# Patient Record
Sex: FEMALE | Race: WHITE
[De-identification: names, ages, dates, MRNs, and addresses within clinical notes are randomized per-mention and may not be internally consistent; named-entity substitution may affect disease eponyms.]

---

## 2018-04-17 ENCOUNTER — HOSPITAL ENCOUNTER (INPATIENT)
Dept: HOSPITAL 17 - NEPD | Age: 23
LOS: 3 days | Discharge: HOME | DRG: 885 | End: 2018-04-20
Attending: PSYCHIATRY & NEUROLOGY | Admitting: PSYCHIATRY & NEUROLOGY
Payer: COMMERCIAL

## 2018-04-17 VITALS — WEIGHT: 125.66 LBS | BODY MASS INDEX: 20.2 KG/M2 | HEIGHT: 66 IN

## 2018-04-17 VITALS
HEART RATE: 88 BPM | DIASTOLIC BLOOD PRESSURE: 89 MMHG | OXYGEN SATURATION: 100 % | TEMPERATURE: 98.6 F | RESPIRATION RATE: 16 BRPM | SYSTOLIC BLOOD PRESSURE: 141 MMHG

## 2018-04-17 VITALS
DIASTOLIC BLOOD PRESSURE: 83 MMHG | TEMPERATURE: 98.7 F | RESPIRATION RATE: 16 BRPM | HEART RATE: 118 BPM | SYSTOLIC BLOOD PRESSURE: 134 MMHG | OXYGEN SATURATION: 96 %

## 2018-04-17 VITALS
SYSTOLIC BLOOD PRESSURE: 141 MMHG | OXYGEN SATURATION: 100 % | RESPIRATION RATE: 16 BRPM | DIASTOLIC BLOOD PRESSURE: 89 MMHG | TEMPERATURE: 98.6 F | HEART RATE: 88 BPM

## 2018-04-17 DIAGNOSIS — Z91.5: ICD-10-CM

## 2018-04-17 DIAGNOSIS — R45.851: ICD-10-CM

## 2018-04-17 DIAGNOSIS — Z91.14: ICD-10-CM

## 2018-04-17 DIAGNOSIS — F33.2: Primary | ICD-10-CM

## 2018-04-17 DIAGNOSIS — F12.10: ICD-10-CM

## 2018-04-17 DIAGNOSIS — Z81.8: ICD-10-CM

## 2018-04-17 DIAGNOSIS — F41.0: ICD-10-CM

## 2018-04-17 DIAGNOSIS — G47.00: ICD-10-CM

## 2018-04-17 PROCEDURE — 83036 HEMOGLOBIN GLYCOSYLATED A1C: CPT

## 2018-04-17 PROCEDURE — 99285 EMERGENCY DEPT VISIT HI MDM: CPT

## 2018-04-17 PROCEDURE — 80061 LIPID PANEL: CPT

## 2018-04-17 PROCEDURE — 80048 BASIC METABOLIC PNL TOTAL CA: CPT

## 2018-04-17 RX ADMIN — Medication SCH MG: at 22:56

## 2018-04-17 NOTE — PD
HPI


Chief Complaint:  Psychiatric Symptoms


Time Seen by Provider:  02:43


Travel History


International Travel<30 days:  No


Contact w/Intl Traveler<30days:  No


Traveled to known affect area:  No





History of Present Illness


HPI


22-year-old white female presents emergency department as a transfer from 

[facility] [place].  The patient states that she was brought to 

the hospital by her significant other and family because she was having 

increasing suicidal thoughts.  She states that she has been battling depression 

for some time.  The patient advised her significant other that she had 

contemplated hanging herself.  Patient cannot explain why she feels more 

depressed today.  There is not any one precipitating event.  She denies any 

toxic ingestion.  She took 2 of her melatonin 5 mg each for sleep.  She denies 

any recent illness.  She denies any alcohol or tobacco.  She does smoke 

marijuana.  The patient had laboratory tests for medical clearance.  Last 

menstrual period was approximately 2 weeks ago.  Currently on birth control.  

The patient was placed under Baker act by the ER physician.





[facility]


Past Medical History


*** Narrative Medical


Depression ,substance abuse


Tetanus Vaccination:  < 5 Years


Pregnant?:  Not Pregnant





Past Surgical History


Surgical History:  No Previous Surgery





Social History


Alcohol Use:  No


Tobacco Use:  No


Substance Use:  Yes (Marijuana)





Review of Systems


General / Constitutional:  No: Fever


Eyes:  No: Visual changes


HENT:  No: Headaches


Cardiovascular:  No: Chest Pain or Discomfort


Respiratory:  No: Shortness of Breath


Gastrointestinal:  No: Abdominal Pain


Genitourinary:  No: Dysuria


Musculoskeletal:  No: Pain


Skin:  No Rash


Neurologic:  No: Weakness


Psychiatric:  Positive: Depression, Suicidal Ideations, Mood Disorder, 

Substance Abuse, No: Anxiety, Disorder of Thought, Homicidal Ideation


Endocrine:  No: Polydipsia


Hematologic/Lymphatic:  No: Easy Bruising





Physical Exam


Narrative


GENERAL: Well-nourished, well-developed patient.


SKIN: Warm and dry.


HEAD: Normocephalic and atraumatic.


EYES: No scleral icterus. No injection or drainage. 


ENT: No nasal drainage noted. Mucous membranes pink. Airway patent.


NECK: Supple, trachea midline.  Moves head freely without obvious discomfort.


CARDIOVASCULAR: Regular rate and rhythm without murmurs, gallops, or rubs. 


RESPIRATORY: Breath sounds equal bilaterally. No accessory muscle use.


GASTROINTESTINAL: Abdomen soft, non-tender, nondistended. 


EXTREMITIES: No cyanosis or edema.


BACK: Nontender without obvious deformity. No CVA tenderness.


NEURO: Patient is alert and oriented. no sensorimotor deficits.  Nonfocal.  

Normal speech.


PSYCH: No delusions.  No auditory or visual hallucinations.





Data


Data


Last Documented VS





Vital Signs








  Date Time  Temp Pulse Resp B/P (MAP) Pulse Ox O2 Delivery O2 Flow Rate FiO2


 


4/17/18 02:45 98.6 88 16 141/89 (106) 100 Room Air  











MDM


Medical Decision Making


Medical Screen Exam Complete:  Yes


Emergency Medical Condition:  Yes


Medical Record Reviewed:  Yes


Interpretation(s)


I reviewed the patient's laboratory tests including CBC, chemistry, hCG, drug 

screen.  Testing was was negative except positive for marijuana.


Differential Diagnosis


MDM: High


Differential diagnoses: Schizophrenia, schizoaffective disorder, bipolar, 

anxiety, depression, adjustment reaction, mood disorder NOS, ODD, depressive 

disorder NOS, dementia, dementia with agitation, psychosis NOS, substance 

induced mood disorder, DMDD, Asperger syndrome, infection,electrolyte 

abnormality, malingering.


Narrative Course


Mental health screening discussed with the patient.  Psychiatric screen ordered.





The patient been medically cleared.





This is medical clearance for psychiatric admission





Diagnosis





 Primary Impression:  


 Medical clearance for psychiatric admission


Condition:  Stable











Nicko Ramon Apr 17, 2018 02:59

## 2018-04-17 NOTE — PD
__________________________________________________





History of Present Illness


Chief Complaint:  Psychiatric Symptoms


Time Seen by Provider:  14:20


Travel History


International Travel<30 Days:  No


Contact w/Intl Traveler<30days:  No


Known affected area:  No





Legal Status


Legal Status:  Involuntary


Baker Act Comment:  Dr. You Carrillo, 


History of Present Illness:


History of Present Illness


HPI


22-year-old single, white female with history of major depression, no previous 

suicide attempt, history of self-injurious behavior mostly superficial cutting, 

presents emergency department as a transfer from [facility] [place] under a certificate of professional initiating involuntary 

examination initiated at [facility] ED.  The patient was brought to the 

hospital by her significant other after she reported that she hung a rope and 

was prepared to hang herself.  She states that she stopped because she did not 

want to hurt her family.  No recent stressors are reported.  She has not taken 

psychiatric medication for at least a year and a half.





Patient is seen and J pod. Alert, oriented, dressed in [facility] and 

maintaining basic hygiene.  Speech is clear, logical.  Fair amount of eye 

contact.  Tearful.  Mood is described as depressed and anxious with 

intermittent panic attack symptoms..  Reports difficulty sleeping without use 

of melatonin, decreased appetite, decreased level of energy, suicidal ideation.

   There is no psychosis, no delusions, no paranoia.  She is a does not endorse 

any symptoms of denice or hypomania.  The patient is expressing her desire to be 

discharged and states "now my family knows how I feel so they will be supportive

".  The patient last took psychiatric medications approximately a year to a 

year and a half ago.  She began treatment at age 16 and took Prozac for over 1 

year.  She stopped treatment because she felt better.  She restarted treatment 

while a student at [employer] She reports that in the past she has been treated with 

Lexapro, Prozac, Zoloft, Wellbutrin, Cymbalta, Pristiq.





I spoke with her mother who arrived to the ED this afternoon.  The mother 

states that the patient has an appointment with Dr. Thomansky  her previous 

psychiatrist on April 25.  Mother is informed that due to her suicidal ideation 

and currently not on medication that it is our recommendation that she be 

admitted to inpatient psychiatry for safety, medication and stabilization.  The 

patient and her mother are concerned because they live in [place].  I 

have advised them that the hospital in [place] stated there was no availability 

in the area.





[facility]


Past Medical History


Hx Anticoagulant Therapy:  No


Depression:  Yes


Cardiovascular Problems:  No


Chemotherapy:  No


Cerebrovascular Accident:  No


Diabetes:  No


Respiratory:  No


Tetanus Vaccination:  < 5 Years


Pregnant?:  Not Pregnant


LMP:  4/1/18





Past Surgical History


Surgical History:  No Previous Surgery


Hysterectomy:  No





Psychiatric History


Psychiatric History


Hx Psychiatric Treatment:  


HAS BEEN IN TREATMENT IN THE PAST (AGE 16) WITH DR CALDERON IN [facility]. 


Also received treatment while a student at [employer].  Has not received


psychiatric treatment in over 1 year.  Past history of self-injurious


behavior mainly superficial cutting of her wrists.


History of Inpatient Treatment:  No


Guns or firearms in home:  No





Social History


Single female .  Graduate of [employer].  Has been working in [employer] 

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  Lives with her boyfriend.


Hx Alcohol Use:  No


Hx Tobacco Use:  No


Hx Substance Use:  Yes (Marijuana)


Substance Use Type:  Marijuana


Hx of Substance Use Treatment:  No





Family Psychiatric History


Mother with report a history of depression





Allergies-Medications


(Allergen,Severity, Reaction):  


Coded Allergies:  


     sertraline (Verified  Allergy, Unknown, 4/17/18)


Reported Meds & Prescriptions





Reported Meds & Active Scripts


Active


Reported


Melatonin 5 Mg Tab 5 Mg PO HS


[Bcp]   1 PO





Review of Systems


Psychiatric:  COMPLAINS OF: Anxiety, Depression, Suicidal Ideation


Except as stated in HPI:  all other systems reviewed are Neg





Mental Status Examination


Appearance:  Appropriate (Maintaining basic hygiene)


Consciousness:  Alert


Orientation:  x4


Motor Activity:  Normal gait


Speech:  Unremarkable


Language:  Adequate


Fund of Knowledge:  Adequate


Attention and Concentration:  Adequate


Memory:  Unremarkable


Mood:  Sad, Other (Depressed)


Affect:  Other (Tearful)


Thought Process & Associations:  Intact, Logical, Goal directed


Thought Content:  Appropriate


Hallucination Type:  None


Delusion Type:  None


Suicidal Ideation:  No


Suicidal Plan:  Yes


Suicidal Intention:  No


Homicidal Ideation:  No


Homicidal Plan:  No


Homicidal Intention:  No


Insight:  Fair


Judgment:  Adequate





MDM


Medical Decision Making


Medical Record Reviewed:  Yes


Assessment/Plan


22-year-old single, white female with history of major depression, no previous 

suicide attempt, history of self-injurious behavior mostly superficial cutting, 

presents emergency department as a transfer from [facility] [place] under a certificate of professional initiating involuntary 

examination initiated at [facility] ED.  The patient was brought to the 

hospital by her significant other after she reported that she hung a rope and 

was prepared to hang herself.  She states that she stopped because she did not 

want to hurt her family.  Once patient is in J pod she is requesting to be 

discharged as she feels that her family are now aware of how depressed she had 

been feeling.  I am reluctant to release the Nassar act due to the patient with 

long history of depression, currently not medicated, with recent suicidal 

ideation with plan to hang herself.  Patient will be admitted to inpatient 

psychiatry for safety, initiation of medication, and for  and for stabilization 

of current symptoms.





Orders





 Orders


Psych Screen (4/17/18 02:58)


Lorazepam (Ativan) (4/17/18 05:15)


Diet Regular Basic (4/17/18 Breakfast)





Results





Vital Signs








  Date Time  Temp Pulse Resp B/P (MAP) Pulse Ox O2 Delivery O2 Flow Rate FiO2


 


4/17/18 02:51 98.6 88 16 141/89 (106) 100   


 


4/17/18 02:45 98.6 88 16 141/89 (106) 100 Room Air  











Diagnosis





 Primary Impression:  


 Medical clearance for psychiatric admission


 Additional Impression:  


 Depression





Admitting Information


Admitting Physician Requests:  Admit


Condition:  Stable





Problem Qualifiers








 Additional Impression:  


 Depression


 Qualified Codes:  F33.1 - Major depressive disorder, recurrent, moderate








Vesta Torres Apr 17, 2018 15:14

## 2018-04-18 VITALS
HEART RATE: 100 BPM | DIASTOLIC BLOOD PRESSURE: 77 MMHG | TEMPERATURE: 97.8 F | RESPIRATION RATE: 18 BRPM | SYSTOLIC BLOOD PRESSURE: 128 MMHG | OXYGEN SATURATION: 99 %

## 2018-04-18 VITALS
TEMPERATURE: 98.3 F | SYSTOLIC BLOOD PRESSURE: 118 MMHG | HEART RATE: 86 BPM | RESPIRATION RATE: 21 BRPM | OXYGEN SATURATION: 99 % | DIASTOLIC BLOOD PRESSURE: 66 MMHG

## 2018-04-18 LAB
BUN SERPL-MCNC: 10 MG/DL (ref 7–18)
CALCIUM SERPL-MCNC: 9.6 MG/DL (ref 8.5–10.1)
CHLORIDE SERPL-SCNC: 105 MEQ/L (ref 98–107)
CHOLEST SERPL-MCNC: 129 MG/DL (ref 120–200)
CHOLESTEROL/ HDL RATIO: 2.27 RATIO
CREAT SERPL-MCNC: 0.83 MG/DL (ref 0.5–1)
GFR SERPLBLD BASED ON 1.73 SQ M-ARVRAT: 86 ML/MIN (ref 89–?)
GLUCOSE SERPL-MCNC: 121 MG/DL (ref 74–106)
HBA1C MFR BLD: 4.9 % (ref 4.3–6)
HCO3 BLD-SCNC: 22.2 MEQ/L (ref 21–32)
HDLC SERPL-MCNC: 56.8 MG/DL (ref 40–60)
LDLC SERPL-MCNC: 52 MG/DL (ref 0–99)
SODIUM SERPL-SCNC: 138 MEQ/L (ref 136–145)
TRIGL SERPL-MCNC: 102 MG/DL (ref 42–150)

## 2018-04-18 RX ADMIN — Medication SCH MG: at 20:43

## 2018-04-18 RX ADMIN — MIRTAZAPINE SCH MG: 15 TABLET, FILM COATED ORAL at 20:43

## 2018-04-18 NOTE — HHI.HP
Provisional Diagnosis


Admission Date


Apr 17, 2018 at 15:21


Axis I.


Major depressive disorder recurrent severe without psychosis, marijuana abuse





                               Certification of Person's Competence 


                           To Provide Express and Informed Consent





I have personally examined Kathie Montoya , a person being served at [facility] on, Apr 18, 2018 12:52.


Express and informed consent means consent voluntarily given in writing, by a 

competent person, after sufficient explanation and disclosure of the subject 

matter involved to enable the person to make a knowing and willful decision 

without any element of force, fraud, deceit, duress, or other form of 

constraint or coercion.





This person is 18 years of age or older, is not now known to be incompetent to 

consent to treatment with a guardian advocate, and does not have a health care 

surrogate or proxy currently making medical treatment decisions.  I have found 

this person to be one of the following:





[] Competent to provide express and informed consent, as defined above, for 

voluntary admission to this facility and is competent to provide express and 

informed consent for treatment.  He/she has the consistent capacity to make 

well reasoned, willful, and knowing decisions concerning his or her medical or 

mental health treatment.  The person fully and consistently understands the 

purpose of the admission for examination/placement and is fully capable of 

personally exercising all rights assured [place]





[] Incompetent to provide express and informed consent to voluntary admission, 

and this is incompetent to provide express and informed consent to treatment.  

The person must be transferred to involuntary status and a petition for a 

guardian advocate filed with the Circuit Court.





[xxx] Refusing to provide express and informed consent to voluntary admission 

but is competent to provide express and informed consent for treatment.  The 

person must be discharged or transferred to involuntary status.





Form shall be completed within 24 hours of a person's arrival at the receiving 

facility and filed in the clinical record of each person:


1. Admitted on a voluntary basis


2. Permitted to provide express and informed consent to his/her own treatment


3. Allowed to transfer from involuntary to voluntary status


4. Prior to permitting a person to consent to his or her own treatment after 

having been previously found incompetent to consent to treatment.





History of Present Illness


Capacity:  Lacks Capacity (Patient lacks capacity to sign for admission, 

patient has capacity to sign for medication)


Psych Chief Complaint:  Depression with suicide intent to hang self


HPI


Patient is a 22-year-old white female comes for under a Nassar act signed by 

You Carrillo D0 dated  and 2130 hrs. daily this document reviewed and agreed 

with stating suicidal ideation and plan to hang self.  Patient was brought to 

[facility] medically screened and cleared at that facility 

urine toxicology positive for marijuana and alcohol level negative.  At the 

present time patient sitting quietly in exam room nurse Ernestine present 

throughout session.  Patient is tearful and sad and somewhat emotional.  She 

states a long history of being more depressed than happy.  She notes increased 

sadness over the past few months though she cannot identify any specific 

precipitating event.  She states she lives with her boyfriend of 5 years.  They 

have been living together for about 6 or 7 months, she states it is a good 

loving relationship.  However she also states over the past few months she has 

been smoking marijuana daily, before that it was occasional though frequent 

use.  She denies alcohol or other drug use.  She states she has been depressed 

mood, there is initial and mid insomnia, a.m. energy, there is marked decreased 

energy, there is decreased appetite, with anhedonia, and a decreased sex drive.

  She states decreased concentration and attention, increased social isolation.

  She finds herself avoiding crowds.  She denies voices or visions with this.  

She states the only self-medicating has been the marijuana.  She states the 

suicidal ideation has been gradually coming up over the past few weeks but is 

only in the past week or so she has been developing various plans leading up to 

her buying equipment to hang herself but stopping herself before she put the 

noose around her neck, calling her boyfriend who called her family leading to 

the Nassar act.  Patient gives a history of past psychiatric contact as an 

outpatient be been tried on multiple antidepressants in the past.  She states 

she had some good luck with Zoloft until she developed a rash.  She does deny 

mood swings.  She states she said her episodes when she felt she had a more 

normal level of energy and focus.  She denies any other suicidal attempts.  She 

denies any prior physical and/or sexual abuse.  She states there is mental 

health history in her family of origin.  It appears mother has called patient's 

prior psychiatrist today.  She can see her on 4/25.  At the present time 

patient meets criteria for involuntary psychiatric hospitalization L the first 

opinion request second opinion.  We did discuss medications.  We will start the 

patient on Remeron 15 mg at at bedtime and Seroquel 25 mg at 8 AM and 4 PM.  We 

will offer Benadryl at at bedtime also.  We will also allow Atarax.  We will 

refrain from benzodiazepines and opiates.  Hope this to be a fairly short stay 

and she can be returned to her boyfriend and her family patient denies any 

significant medical or surgical history.  Denies head trauma or loss of 

consciousness, states she has been on birth control pills for a number of years.





Review of Systems


Constitutional:  DENIES: Diaphoretic episodes, Fatigue, Fever, Weight gain, 

Weight loss, Chills, Dizziness, Change in appetite, Night Sweats


Endocrine:  DENIES: Abnorml menstrual pattern, Heat/cold intolerance, Polydipsia

, Polyuria, Polyphagia


Eyes:  DENIES: Blurred vision, Diplopia, Eye inflammation, Eye pain, Vision loss

, Photosensitivity, Double Vision


Ears, nose, mouth, throat:  DENIES: Tinnitus, Hearing loss, Vertigo, Nasal 

discharge, Oral lesions, Throat pain, Hoarseness, Ear Pain, Running Nose, 

Epistaxis, Sinus Pain, Toothache, Odynophagia


Respiratory:  DENIES: Apneas, Cough, Snoring, Wheezing, Hemoptysis, Sputum 

production, Shortness of breath


Cardiovascular:  DENIES: Chest pain, Palpitations, Syncope, Dyspnea on Exertion

, PND, Lower Extremity Edema, Orthopnea, Claudication


Gastrointestinal:  DENIES: Abdominal pain, Black stools, Bloody stools, 

Constipation, Diarrhea, Nausea, Vomiting, Difficulty Swallowing, Anorexia


Genitourinary:  DENIES: Abnormal vaginal bleeding, Dysmenorrhea, Dyspareunia, 

Sexual dysfunction, Urinary frequency, Urinary incontinence, Urgency, Hematuria

, Dysuria, Nocturia, Vaginal discharge


Musculoskeletal:  DENIES: Joint pain, Muscle aches, Stiffness, Joint Swelling, 

Back pain, Neck pain


Integumentary:  DENIES: Abnormal pigmentation, Pruritus, Rash, Nail changes, 

Breast masses, Breast skin changes, Nipple discharge


Hematologic/lymphatic:  DENIES: Bruising, Lymphadenopathy


Immunologic/allergic:  DENIES: Eczema, Urticaria


Neurologic:  DENIES: Abnormal gait, Headache, Localized weakness, Paresthesias, 

Seizures, Speech Problems, Tremor, Poor Balance


Psychiatric:  COMPLAINS OF: Anxiety, Depression, Suicidal Ideation





Past Psych History


Psychological trauma history


Patient denies any physical or sexual abuse


Violence risk - others (6 mos)


Low


Violence risk - self (6 mos)


Patient had plan to hang self





Substance Abuse History


Drugs/Alcohol past 12 months


Patient verbal able access healthcare has supportive family and boyfriend





Past Family Social History


Coded Allergies:  


     sertraline (Verified  Allergy, Unknown, 4/17/18)


Reported Medications


Melatonin (Melatonin) 5 Mg Tab, 5 MG PO HS for Provide Good Sleep, TAB 0 Refills


   4/17/18


[Bcp]   No Conflict Check, 1 PO


   4/17/18





Current Medications








 Medications


  (Trade)  Dose


 Ordered  Sig/Garret


 Route  Start Time


 Stop Time Status Last Admin


 


  (Tylenol)  650 mg  Q4H  PRN


 PO  4/17/18 15:30


     


 


 


  (Milk Of


 Magnesia Liq)  30 ml  DAILY  PRN


 PO  4/17/18 15:30


     


 


 


  (Mag-Al Plus


 Susp Liq)  30 ml  Q6H  PRN


 PO  4/17/18 15:30


     


 


 


  (Melatonin)  5 mg  HS


 PO  4/17/18 21:00


    4/17/18 22:56


 


 


  (Remeron)  15 mg  HS


 PO  4/18/18 21:00


   UNV  


 


 


  (SEROquel)  25 mg  BID@0800,1600


 PO  4/18/18 16:00


   UNV  


 


 


  (Benadryl)  50 mg  HS  PRN


 PO  4/18/18 12:45


   UNV  


 


 


  (Atarax)  50 mg  Q6H  PRN


 PO  4/18/18 12:45


   UNV  


 








Family Psych History


Strong history mental health issues and family


Social History


Patient living with boyfriend of 5 years have been cohabitating for about 6 

months, she also has a pet hamster


Patient's Strengths (min. 2)


Patient verbal able access healthcare





Physical Exam


Patient medically cleared [facility].  At the present time 

patient sitting quietly in exam room she is in no acute distress.  She is in no 

respiratory distress.  No complaints of abdominal pain.  Patient moving all 4 

extremities without difficulty.  No abnormal motor movements noted


Vital Signs





Vital Signs








  Date Time  Temp Pulse Resp B/P (MAP) Pulse Ox O2 Delivery O2 Flow Rate FiO2


 


4/18/18 05:33 98.3 86 21 118/66 (83) 99   


 


4/17/18 02:45      Room Air  














I/O   


 


 4/18/18 4/18/18 4/19/18





 08:00 16:00 00:00


 


Intake Total  240 ml 


 


Balance  240 ml 








Lab Results











Test


  4/18/18


08:15


 


Blood Urea Nitrogen 10 MG/DL 


 


Creatinine 0.83 MG/DL 


 


Random Glucose 121 MG/DL 


 


Calcium Level 9.6 MG/DL 


 


Sodium Level 138 MEQ/L 


 


Potassium Level 3.5 MEQ/L 


 


Chloride Level 105 MEQ/L 


 


Carbon Dioxide Level 22.2 MEQ/L 


 


Anion Gap 11 MEQ/L 


 


Estimat Glomerular Filtration


Rate 86 ML/MIN 


 


 


Triglycerides Level 102 MG/DL 


 


Cholesterol Level 129 MG/DL 


 


LDL Cholesterol 52 MG/DL 


 


HDL Cholesterol 56.8 MG/DL 


 


Cholesterol/HDL Ratio 2.27 RATIO 











Mental Status Examination


Appearance:  Appropriate (Maintaining basic hygiene)


Consciousness:  Alert


Orientation:  x4


Motor Activity:  Normal gait


Speech:  Unremarkable


Language:  Adequate


Fund of Knowledge:  Adequate


Attention and Concentration:  Adequate


Memory:  Unremarkable


Mood:  Sad, Anxious, Other (Depressed)


Affect:  Other (Increased range and intensity)


Thought Process & Associations:  Intact, Logical, Goal directed


Thought Content:  Appropriate


Hallucination Type:  None


Delusion Type:  None


Suicidal Ideation:  Yes (Patient denies at present time was somewhat vague 

about taking suicide pill)


Suicidal Plan:  Yes (Patient denies at the present time)


Suicidal Intention:  No


Homicidal Ideation:  No


Homicidal Plan:  No


Homicidal Intention:  No


Insight:  Fair


Judgment:  Adequate





Assessment & Plan


Problem List:  


(1) Major depressive disorder, recurrent severe without psychotic features


ICD Codes:  F33.2 - Major depressive disorder, recurrent severe without 

psychotic features


(2) Marijuana abuse


ICD Codes:  F12.10 - Cannabis abuse, uncomplicated


Assessment & Plan


Estimated LOS: 3-5 days patient continues depressed with vague suicidal ideation

, patient does meet criteria for further hospitalization assessment under the 

Nassar act L the first opinion request second opinion.  I feel she is capacity 

signed for medication.  We will start on medications as mentioned above 

hopefully plan to return her to her family and boyfriend


Discharge Planning


Probable return home to family and boyfriend


Request HC Surrog/Guard Advoc?:  No











Norman Callahan MD Apr 18, 2018 13:08

## 2018-04-19 VITALS
OXYGEN SATURATION: 97 % | TEMPERATURE: 98.1 F | RESPIRATION RATE: 16 BRPM | SYSTOLIC BLOOD PRESSURE: 104 MMHG | DIASTOLIC BLOOD PRESSURE: 60 MMHG | HEART RATE: 92 BPM

## 2018-04-19 VITALS
OXYGEN SATURATION: 96 % | SYSTOLIC BLOOD PRESSURE: 133 MMHG | TEMPERATURE: 97.7 F | RESPIRATION RATE: 17 BRPM | HEART RATE: 81 BPM | DIASTOLIC BLOOD PRESSURE: 83 MMHG

## 2018-04-19 RX ADMIN — Medication SCH MG: at 22:11

## 2018-04-19 RX ADMIN — MIRTAZAPINE SCH MG: 15 TABLET, FILM COATED ORAL at 22:11

## 2018-04-19 NOTE — PD.PSY.CON
Provisional Diagnosis


Admission Date


Apr 17, 2018 at 15:21


Axis I.


Major depressive disorder recurrent severe without psychosis, marijuana abuse





History of Present Illness


Service


Psychiatry


Consult Requested By


Dr. Callahan


Reason for Consult


Suicidal ideation/second


Primary Care Physician


Unknown


HPI


Patient is a 22-year-old white female comes for under a Nassar act signed by 

You Carrillo D0 dated  and 2130 hrs. daily this document reviewed and agreed 

with stating suicidal ideation and plan to hang self.  Patient was brought to 

[facility] medically screened and cleared at that facility 

urine toxicology positive for marijuana and alcohol level negative.  At the 

present time patient sitting quietly in exam room nurse Ernestine present 

throughout session.  Patient is tearful and sad and somewhat emotional.  She 

states a long history of being more depressed than happy.  She notes increased 

sadness over the past few months though she cannot identify any specific 

precipitating event.  She states she lives with her boyfriend of 5 years.  They 

have been living together for about 6 or 7 months, she states it is a good 

loving relationship.  However she also states over the past few months she has 

been smoking marijuana daily, before that it was occasional though frequent 

use.  She denies alcohol or other drug use.  She states she has been depressed 

mood, there is initial and mid insomnia, a.m. energy, there is marked decreased 

energy, there is decreased appetite, with anhedonia, and a decreased sex drive.

  She states decreased concentration and attention, increased social isolation.

  She finds herself avoiding crowds.  She denies voices or visions with this.  

She states the only self-medicating has been the marijuana.  She states the 

suicidal ideation has been gradually coming up over the past few weeks but is 

only in the past week or so she has been developing various plans leading up to 

her buying equipment to hang herself but stopping herself before she put the 

noose around her neck, calling her boyfriend who called her family leading to 

the Nassar act.  Patient gives a history of past psychiatric contact as an 

outpatient be been tried on multiple antidepressants in the past.  She states 

she had some good luck with Zoloft until she developed a rash.  She does deny 

mood swings.  She states she said her episodes when she felt she had a more 

normal level of energy and focus.  She denies any other suicidal attempts.  She 

denies any prior physical and/or sexual abuse.  She states there is mental 

health history in her family of origin.  It appears mother has called patient's 

prior psychiatrist today.  She can see her on 4/25.  At the present time 

patient meets criteria for involuntary psychiatric hospitalization L the first 

opinion request second opinion.  We did discuss medications.  We will start the 

patient on Remeron 15 mg at at bedtime and Seroquel 25 mg at 8 AM and 4 PM.  We 

will offer Benadryl at at bedtime also.  We will also allow Atarax.  We will 

refrain from benzodiazepines and opiates.  Hope this to be a fairly short stay 

and she can be returned to her boyfriend and her family patient denies any 

significant medical or surgical history.  Denies head trauma or loss of 

consciousness, states she has been on birth control pills for a number of years.





The patient is a 22-year-old  woman, domiciled with boyfriend in 

[place], [facility], with psychiatric history of major depressive disorder, 

anxiety, no previous psychiatric hospitalizations, no previous suicide attempts

, no significant medical history,who comes for under a Nassar act signed by 

You Carrillo D0 dated  and 2130 hrs. daily this document reviewed and agreed 

with stating suicidal ideation and plan to hang self.  Patient was brought to 

[facility] medically screened and cleared at that facility 

urine toxicology positive for marijuana and alcohol level negative.  Patient 

was consulted to me for second opinion.  On psychiatric evaluation today 

patient is calm, cooperative, pleasant.  Patient reports that she has been very 

depressed, with poor motivation to do things, with increased emotional pain, no 

enjoying her life, and thinking often in hanging herself.  She reports that she 

gets into this periods every time or often.  At this moment she feels much 

better, feels like she is getting the help that she needs.  She denies suicidal 

and was ideation, she denies visual and auditory hallucinations.





Review of Systems


Constitutional:  DENIES: Diaphoretic episodes, Fatigue, Fever, Weight gain, 

Weight loss, Chills, Dizziness, Change in appetite, Night Sweats


Endocrine:  DENIES: Abnorml menstrual pattern, Heat/cold intolerance, Polydipsia

, Polyuria, Polyphagia


Eyes:  DENIES: Blurred vision, Diplopia, Eye inflammation, Eye pain, Vision loss

, Photosensitivity, Double Vision


Ears, nose, mouth, throat:  DENIES: Tinnitus, Hearing loss, Vertigo, Nasal 

discharge, Oral lesions, Throat pain, Hoarseness, Ear Pain, Running Nose, 

Epistaxis, Sinus Pain, Toothache, Odynophagia


Respiratory:  DENIES: Apneas, Cough, Snoring, Wheezing, Hemoptysis, Sputum 

production, Shortness of breath


Cardiovascular:  DENIES: Chest pain, Palpitations, Syncope, Dyspnea on Exertion

, PND, Lower Extremity Edema, Orthopnea, Claudication


Gastrointestinal:  DENIES: Abdominal pain, Black stools, Bloody stools, 

Constipation, Diarrhea, Nausea, Vomiting, Difficulty Swallowing, Anorexia


Genitourinary:  DENIES: Abnormal vaginal bleeding, Dysmenorrhea, Dyspareunia, 

Sexual dysfunction, Urinary frequency, Urinary incontinence, Urgency, Hematuria

, Dysuria, Nocturia, Vaginal discharge


Musculoskeletal:  DENIES: Joint pain, Muscle aches, Stiffness, Joint Swelling, 

Back pain, Neck pain


Integumentary:  DENIES: Abnormal pigmentation, Pruritus, Rash, Nail changes, 

Breast masses, Breast skin changes, Nipple discharge


Hematologic/lymphatic:  DENIES: Bruising, Lymphadenopathy


Immunologic/allergic:  DENIES: Eczema, Urticaria


Neurologic:  DENIES: Abnormal gait, Headache, Localized weakness, Paresthesias, 

Seizures, Speech Problems, Tremor, Poor Balance


Psychiatric:  DENIES: Anxiety, Confusion, Mood changes, Depression, 

Hallucinations, Agitation, Suicidal Ideation, Homicidal Ideation, Delusions





Past Family Social History


Coded Allergies:  


     sertraline (Verified  Allergy, Unknown, 4/17/18)


Reported Medications


Melatonin (Melatonin) 5 Mg Tab, 5 MG PO HS for Provide Good Sleep, TAB 0 Refills


   4/17/18


[Bcp]   No Conflict Check, 1 PO


   4/17/18





Current Medications








 Medications


  (Trade)  Dose


 Ordered  Sig/Garret


 Route  Start Time


 Stop Time Status Last Admin


 


  (Tylenol)  650 mg  Q4H  PRN


 PO  4/17/18 15:30


     


 


 


  (Milk Of


 Magnesia Liq)  30 ml  DAILY  PRN


 PO  4/17/18 15:30


     


 


 


  (Mag-Al Plus


 Susp Liq)  30 ml  Q6H  PRN


 PO  4/17/18 15:30


     


 


 


  (Melatonin)  5 mg  HS


 PO  4/17/18 21:00


    4/18/18 20:43


 


 


  (Remeron)  15 mg  HS


 PO  4/18/18 21:00


    4/18/18 20:43


 


 


  (Benadryl)  50 mg  HS  PRN


 PO  4/18/18 21:00


    4/18/18 20:43


 


 


  (Atarax)  50 mg  Q6H  PRN


 PO  4/18/18 12:45


     


 


 


  (SEROquel)  25 mg  BID@0800,1600


 PO  4/18/18 16:00


    4/19/18 08:31


 








Social History


Patient was born and raised in [place], she lives with her boyfriend in 

[place], she works in the [employer], she has a bachelor degree


Patient's Strengths (min. 2)


Patient verbal able access healthcare





Physical Exam


Vital Signs





Vital Signs








  Date Time  Temp Pulse Resp B/P (MAP) Pulse Ox O2 Delivery O2 Flow Rate FiO2


 


4/19/18 05:43 98.1 92 16 104/60 (75) 97   


 


4/17/18 02:45      Room Air  











Mental Status Examination


Appearance:  Appropriate (Maintaining basic hygiene)


Consciousness:  Alert


Orientation:  x4


Motor Activity:  Normal gait


Speech:  Unremarkable


Language:  Adequate


Fund of Knowledge:  Adequate


Attention and Concentration:  Adequate


Memory:  Unremarkable


Mood:  Sad, Anxious, Other (Depressed)


Affect:  Other (Increased range and intensity)


Thought Process & Associations:  Intact, Logical, Goal directed


Thought Content:  Appropriate


Hallucination Type:  None


Delusion Type:  None


Suicidal Ideation:  Yes (Patient denies at present time was somewhat vague 

about taking suicide pill)


Suicidal Plan:  Yes (Patient denies at the present time)


Suicidal Intention:  No


Homicidal Ideation:  No


Homicidal Plan:  No


Homicidal Intention:  No


Insight:  Fair


Judgment:  Adequate





Assessment & Plan


Problem List:  


(1) Major depressive disorder, recurrent severe without psychotic features


ICD Codes:  F33.2 - Major depressive disorder, recurrent severe without 

psychotic features


Assessment & Plan:  I have seen and examined this patient, review the 

documentation, I agree and concur with Dr. Callahan's assessment and plan.





(2) Marijuana abuse


ICD Codes:  F12.10 - Cannabis abuse, uncomplicated


Assessment & Plan


Estimated LOS:  days


Request HC Surrog/Guard Advoc?:  No











Tevin Kelley MD Apr 19, 2018 10:22

## 2018-04-20 VITALS
HEART RATE: 92 BPM | OXYGEN SATURATION: 99 % | SYSTOLIC BLOOD PRESSURE: 123 MMHG | DIASTOLIC BLOOD PRESSURE: 66 MMHG | RESPIRATION RATE: 16 BRPM | TEMPERATURE: 98.2 F

## 2018-04-20 NOTE — HHI.DS
Psychiatry Discharge Summary


Inpatient Psychiatric care?:  Yes


Advance Directive:  No


Reason Not Provided:  Due to Patient Condition


Mental Health AdvanceDirective:  No


Health Care Proxy:  No


Admission


Admission Date


Apr 17, 2018 at 15:21


Admission Diagnosis:  


(1) Major depressive disorder, recurrent severe without psychotic features


ICD Code:  F33.2 - Major depressive disorder, recurrent severe without 

psychotic features


Brief History


Patient is a 22-year-old white female comes for under a Nassar act signed by 

You Carrillo D0 dated  and 2130 hrs. daily this document reviewed and agreed 

with stating suicidal ideation and plan to hang self.  Patient was brought to 

[facility] medically screened and cleared at that facility 

urine toxicology positive for marijuana and alcohol level negative.  At the 

present time patient sitting quietly in exam room nurse Ernestine present 

throughout session.  Patient is tearful and sad and somewhat emotional.  She 

states a long history of being more depressed than happy.  She notes increased 

sadness over the past few months though she cannot identify any specific 

precipitating event.  She states she lives with her boyfriend of 5 years.  They 

have been living together for about 6 or 7 months, she states it is a good 

loving relationship.  However she also states over the past few months she has 

been smoking marijuana daily, before that it was occasional though frequent 

use.  She denies alcohol or other drug use.  She states she has been depressed 

mood, there is initial and mid insomnia, a.m. energy, there is marked decreased 

energy, there is decreased appetite, with anhedonia, and a decreased sex drive.

  She states decreased concentration and attention, increased social isolation.

  She finds herself avoiding crowds.  She denies voices or visions with this.  

She states the only self-medicating has been the marijuana.  She states the 

suicidal ideation has been gradually coming up over the past few weeks but is 

only in the past week or so she has been developing various plans leading up to 

her buying equipment to hang herself but stopping herself before she put the 

noose around her neck, calling her boyfriend who called her family leading to 

the Nassar act.  Patient gives a history of past psychiatric contact as an 

outpatient be been tried on multiple antidepressants in the past.  She states 

she had some good luck with Zoloft until she developed a rash.  She does deny 

mood swings.  She states she said her episodes when she felt she had a more 

normal level of energy and focus.  She denies any other suicidal attempts.  She 

denies any prior physical and/or sexual abuse.  She states there is mental 

health history in her family of origin.  It appears mother has called patient's 

prior psychiatrist today.  She can see her on 4/25.  At the present time 

patient meets criteria for involuntary psychiatric hospitalization L the first 

opinion request second opinion.  We did discuss medications.  We will start the 

patient on Remeron 15 mg at at bedtime and Seroquel 25 mg at 8 AM and 4 PM.  We 

will offer Benadryl at at bedtime also.  We will also allow Atarax.  We will 

refrain from benzodiazepines and opiates.  Hope this to be a fairly short stay 

and she can be returned to her boyfriend and her family patient denies any 

significant medical or surgical history.  Denies head trauma or loss of 

consciousness, states she has been on birth control pills for a number of years.





The patient is a 22-year-old  woman, domiciled with boyfriend in 

[place], [facility], with psychiatric history of major depressive disorder, 

anxiety, no previous psychiatric hospitalizations, no previous suicide attempts

, no significant medical history,who comes for under a Nassar act signed by 

You Carrillo D0 dated  and 2130 hrs. daily this document reviewed and agreed 

with stating suicidal ideation and plan to hang self.  Patient was brought to 

[facility] medically screened and cleared at that facility 

urine toxicology positive for marijuana and alcohol level negative.  Patient 

was consulted to me for second opinion.  On psychiatric evaluation today 

patient is calm, cooperative, pleasant.  Patient reports that she has been very 

depressed, with poor motivation to do things, with increased emotional pain, no 

enjoying her life, and thinking often in hanging herself.  She reports that she 

gets into this periods every time or often.  At this moment she feels much 

better, feels like she is getting the help that she needs.  She denies suicidal 

and was ideation, she denies visual and auditory hallucinations.


Tobacco Use In Past 30 Days:  No Tobacco Past 30 Days


Alcohol Use:  Never


Hospital Course


Patient was admitted to a locked psychiatric unit.  Safety precautions were in 

place throughout the visit.  Patient was seen daily by a psychiatric provider 

and followed by counselor.  Patient was placed on Seroquel to manage symptoms.  

At follow-up today patient was evaluated in the exam room.  After reviewing her 

chart and discussing case with staff.  Patient is awake, alert and oriented 4.

  Her speech is clear, logical, and organized.  Her mood is good her affect is 

euthymic.  There is no indication of internal stimulation.  I can elicit no 

delusional material.  There is no apparent thought blocking.  Patient denies 

suicidal and homicidal ideation, she denies AVH.  Patient reports that she 

"feels much better".  States that she has been sleeping and eating well.  She 

advises that she already has an appointment set up with a psychiatrist that she 

used to see for next week.  She states that she will be moving back in with her 

boyfriend however, her parents live close.  Her counselor, Gail, reports 

that the mother has been staying locally at a hotel throughout the visit and 

remains extremely involved with her daughter.  I believe at this time that 

patient has reached maximum benefit for this admission.  She no longer meets 

Baker act or inpatient admission criteria and does not pose a danger to herself 

or others.  She will be discharged with a prescription for her medications and 

instructions to return to this facility should her condition worsen at any time.





Results


Blood Pressure


123 / 66





Vital Signs








  Date Time  Temp Pulse Resp B/P (MAP) Pulse Ox O2 Delivery O2 Flow Rate FiO2


 


4/20/18 05:01 98.2 92 16 123/66 (85) 99   


 


4/17/18 02:45      Room Air  











Laboratory Tests








Test


  4/18/18


08:15


 


Random Glucose


  121 MG/DL


()


 


Estimat Glomerular Filtration


Rate 86 ML/MIN


(>89)








 Laboratory Results








Test


  4/18/18


08:15


 


Cholesterol Level


  129 MG/DL


(120-200)


 


HDL Cholesterol


  56.8 MG/DL


(40.0-60.0)


 


Hemoglobin A1c


  4.9 %


(4.3-6.0)


 


LDL Cholesterol


  52 MG/DL


(0-99)


 


Triglycerides Level


  102 MG/DL


()








Summary of Procedures


None


Pending results at discharge:  No





Medications


# of Antipsychotic meds at D/C:  1


Appropriate >1 Antipsych meds?:  1


Approp Antipsych med options


1 - Minimum of three failed multiple trials of monotherapy.


2 - Documented plan to taper to monotherapy due to previous use of multiple 

meds OR cross-taper in progress at D/C.


3 - Documentation of augmentation of Clozapine.


4 - Justification other than those listed in allowable values 1-3, document here

:





Discharge


Discharge Date:  Apr 20, 2018


Discharge Diagnosis:  


(1) Major depressive disorder, recurrent severe without psychotic features


Diagnosis:  Principal


ICD Code:  F33.2 - Major depressive disorder, recurrent severe without 

psychotic features


Pt Condition on Discharge:  Stable


Discharge Disposition:  Discharge Home





Discharge Instructions


Diet Instructions:  As Tolerated, No Restrictions


Activities you can perform:  Regular-No Restrictions





Discharge Time


> 30 minutes





Mental Status Examination


Appearance:  Appropriate (Maintaining basic hygiene)


Consciousness:  Alert


Orientation:  x4


Motor Activity:  Normal gait


Speech:  Unremarkable


Language:  Adequate


Fund of Knowledge:  Adequate


Attention and Concentration:  Adequate


Memory:  Unremarkable


Mood:  Sad, Anxious, Other (Depressed)


Affect:  Other (Increased range and intensity)


Thought Process & Associations:  Intact, Logical, Goal directed


Thought Content:  Appropriate


Hallucination Type:  None


Delusion Type:  None


Suicidal Ideation:  No


Suicidal Plan:  No


Suicidal Intention:  No


Homicidal Ideation:  No


Homicidal Plan:  No


Homicidal Intention:  No


Insight:  Fair


Judgment:  Adequate





Discharge/Advance Care Plan


Health Problems:  


(1) Major depressive disorder, recurrent severe without psychotic features


(2) Marijuana abuse


Goals to promote your health


* To prevent worsening of your condition and complications


* To maintain your health at the optimal level


Directions to meet your goals


*** Take your medications as prescribed


***  Follow your dietary instruction


***  Follow activity as directed





***  Keep your appointments as scheduled


***  Take your immunizations and boosters as scheduled


***  If your symptoms worsen call your PCP, if no PCP go to Urgent Care Center 

or Emergency Room ***


***  For 24/7 questions related to your inpatient stay or results of tests 

pending at discharge, please contact Dr. Lu Stark at [phone]


***  Smoking is Dangerous to Your Health. Avoid second hand smoking ***











Lu Stark Apr 20, 2018 13:28

## 2019-02-05 NOTE — HHI.PYPN
Subjective


Chief Complaint:  Depression with suicide intent to hang self


Remarks


Patient seen in her room the floor staff, chart reviewed, patient complaint 

medications, patient discussed with nurse.  Patient states she slept well last 

night, also stated she had visits from her mother her boyfriend and another 

family friend.  Those visits all seem to have went well also.  Patient states 

no problems with the medication at this time.  Today she denies suicidality or 

homicidality voices or visions.  She was slightly vague about her compliance 

regarding total abstinence from drugs including marijuana.  For now continue 

treatment consider discharge 1-2 days.  Patient does have a psychiatric 

appointment on 4/25





Review of Systems


Except as stated in HPI:  all other systems reviewed are Neg





Mental Status Examination


Appearance:  Appropriate (Maintaining basic hygiene)


Consciousness:  Alert


Orientation:  x4


Motor Activity:  Normal gait


Speech:  Unremarkable


Language:  Adequate


Fund of Knowledge:  Adequate


Attention and Concentration:  Adequate


Memory:  Unremarkable


Mood:  Sad, Anxious, Other (Depressed)


Affect:  Other (Increased range and intensity)


Thought Process & Associations:  Intact, Logical, Goal directed


Thought Content:  Appropriate


Hallucination Type:  None


Delusion Type:  None


Suicidal Ideation:  No


Suicidal Plan:  No


Suicidal Intention:  No


Homicidal Ideation:  No


Homicidal Plan:  No


Homicidal Intention:  No


Insight:  Fair


Judgment:  Adequate





Results


Vitals/IOs





Vital Signs








  Date Time  Temp Pulse Resp B/P (MAP) Pulse Ox O2 Delivery O2 Flow Rate FiO2


 


4/19/18 05:43 98.1 92 16 104/60 (75) 97   


 


4/17/18 02:45      Room Air  











Assessment & Plan


Problem List:  


(1) Major depressive disorder, recurrent severe without psychotic features


ICD Codes:  F33.2 - Major depressive disorder, recurrent severe without 

psychotic features


(2) Marijuana abuse


ICD Codes:  F12.10 - Cannabis abuse, uncomplicated


Assessment & Plan


Estimated LOS:  days patient is improving, she denies suicidality today and 

denies voices today.  She says she slept well.  Ms. had good conversations with 

mother, boyfriend, and family friend.  The patient continues to improve 

consider discharge within 24-48 hours.  Patient does have appointment with 

psychiatrist on 4/25


Justification for Cont. Inpt.


At this time patient may decompensate if not placed on appropriate level of care


Discharge Planning


See above probable discharge to family within 24-48 hours


Request HC Surrog/Guard Advoc?:  No











Norman Callahan MD Apr 19, 2018 13:19
Eyeglasses

## 2019-11-04 ENCOUNTER — APPOINTMENT (RX ONLY)
Age: 24
Setting detail: DERMATOLOGY
End: 2019-11-04

## 2019-11-04 ENCOUNTER — APPOINTMENT (OUTPATIENT)
Age: 24
Setting detail: DERMATOLOGY
End: 2019-11-04

## 2019-11-04 DIAGNOSIS — D485 NEOPLASM OF UNCERTAIN BEHAVIOR OF SKIN: ICD-10-CM

## 2019-11-04 DIAGNOSIS — L57.8 OTHER SKIN CHANGES DUE TO CHRONIC EXPOSURE TO NONIONIZING RADIATION: ICD-10-CM

## 2019-11-04 DIAGNOSIS — L81.4 OTHER MELANIN HYPERPIGMENTATION: ICD-10-CM

## 2019-11-04 PROBLEM — D48.5 NEOPLASM OF UNCERTAIN BEHAVIOR OF SKIN: Status: ACTIVE | Noted: 2019-11-04

## 2019-11-04 PROBLEM — F32.9 MAJOR DEPRESSIVE DISORDER, SINGLE EPISODE, UNSPECIFIED: Status: ACTIVE | Noted: 2019-11-04

## 2019-11-04 PROBLEM — F41.9 ANXIETY DISORDER, UNSPECIFIED: Status: ACTIVE | Noted: 2019-11-04

## 2019-11-04 PROCEDURE — ? BIOPSY BY SHAVE METHOD

## 2019-11-04 PROCEDURE — 99201: CPT | Mod: 25

## 2019-11-04 PROCEDURE — 11102 TANGNTL BX SKIN SINGLE LES: CPT

## 2019-11-04 PROCEDURE — ? COUNSELING

## 2019-11-04 ASSESSMENT — LOCATION DETAILED DESCRIPTION DERM
LOCATION DETAILED: RIGHT SUPERIOR MEDIAL UPPER BACK
LOCATION DETAILED: LEFT MID-UPPER BACK
LOCATION DETAILED: RIGHT SUPERIOR MEDIAL UPPER BACK
LOCATION DETAILED: SUPERIOR THORACIC SPINE
LOCATION DETAILED: LEFT MID-UPPER BACK
LOCATION DETAILED: SUPERIOR THORACIC SPINE

## 2019-11-04 ASSESSMENT — LOCATION ZONE DERM
LOCATION ZONE: TRUNK
LOCATION ZONE: TRUNK

## 2019-11-04 ASSESSMENT — LOCATION SIMPLE DESCRIPTION DERM
LOCATION SIMPLE: LEFT UPPER BACK
LOCATION SIMPLE: UPPER BACK
LOCATION SIMPLE: UPPER BACK
LOCATION SIMPLE: RIGHT UPPER BACK
LOCATION SIMPLE: LEFT UPPER BACK
LOCATION SIMPLE: RIGHT UPPER BACK

## 2019-11-04 NOTE — PROCEDURE: BIOPSY BY SHAVE METHOD
Wound Care: Bacitracin
Anesthesia Volume In Cc: 0.5
Render In Bullet Format When Appropriate: No
Hemostasis: Aluminum Chloride
Electrodesiccation And Curettage Text: The wound bed was treated with electrodesiccation and curettage after the biopsy was performed.
Consent: Written consent was obtained and risks were reviewed including but not limited to scarring, infection, bleeding, scabbing, incomplete removal, nerve damage and allergy to anesthesia.
Billing Type: Third-Party Bill
Depth Of Biopsy: dermis
Biopsy Type: H and E
Size Of Lesion In Cm: 0.4
Notification Instructions: Patient will be notified of biopsy results. However, patient instructed to call the office if not contacted within 2 weeks.
Electrodesiccation Text: The wound bed was treated with electrodesiccation after the biopsy was performed.
Additional Anesthesia Volume In Cc (Will Not Render If 0): 0
Cryotherapy Text: The wound bed was treated with cryotherapy after the biopsy was performed.
Was A Bandage Applied: Yes
Biopsy Method: double edge Personna blade
Silver Nitrate Text: The wound bed was treated with silver nitrate after the biopsy was performed.
Detail Level: Simple
Post-Care Instructions: I reviewed with the patient in detail post-care instructions. Patient is to keep the biopsy site dry overnight, and then apply bacitracin twice daily until healed. Patient may apply hydrogen peroxide soaks to remove any crusting.
Type Of Destruction Used: Curettage
Anesthesia Type: 0.5% lidocaine with 1:100,000 epinephrine and a 1:10 solution of 8.4% sodium bicarbonate
Dressing: bandage

## 2019-11-04 NOTE — PROCEDURE: BIOPSY BY SHAVE METHOD
Bill For Surgical Tray: no
Biopsy Type: H and E
Electrodesiccation Text: The wound bed was treated with electrodesiccation after the biopsy was performed.
Notification Instructions: Patient will be notified of biopsy results. However, patient instructed to call the office if not contacted within 2 weeks.
Anesthesia Volume In Cc: 0.5
Detail Level: Simple
Biopsy Method: double edge Personna blade
Hemostasis: Aluminum Chloride
Electrodesiccation And Curettage Text: The wound bed was treated with electrodesiccation and curettage after the biopsy was performed.
Dressing: bandage
Was A Bandage Applied: Yes
Type Of Destruction Used: Curettage
Consent: Written consent was obtained and risks were reviewed including but not limited to scarring, infection, bleeding, scabbing, incomplete removal, nerve damage and allergy to anesthesia.
Anesthesia Type: 0.5% lidocaine with 1:100,000 epinephrine and a 1:10 solution of 8.4% sodium bicarbonate
Billing Type: Third-Party Bill
Silver Nitrate Text: The wound bed was treated with silver nitrate after the biopsy was performed.
X Size Of Lesion In Cm: 0
Post-Care Instructions: I reviewed with the patient in detail post-care instructions. Patient is to keep the biopsy site dry overnight, and then apply bacitracin twice daily until healed. Patient may apply hydrogen peroxide soaks to remove any crusting.
Cryotherapy Text: The wound bed was treated with cryotherapy after the biopsy was performed.
Lab: [id number]
Wound Care: Bacitracin
Size Of Lesion In Cm: 0.4
Depth Of Biopsy: dermis